# Patient Record
Sex: MALE | Race: WHITE | NOT HISPANIC OR LATINO | Employment: FULL TIME | URBAN - METROPOLITAN AREA
[De-identification: names, ages, dates, MRNs, and addresses within clinical notes are randomized per-mention and may not be internally consistent; named-entity substitution may affect disease eponyms.]

---
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
## 2025-07-12 ENCOUNTER — HOSPITAL ENCOUNTER (EMERGENCY)
Facility: HOSPITAL | Age: 20
Discharge: HOME | End: 2025-07-12
Attending: EMERGENCY MEDICINE
Payer: COMMERCIAL

## 2025-07-12 VITALS
WEIGHT: 158.51 LBS | DIASTOLIC BLOOD PRESSURE: 70 MMHG | TEMPERATURE: 97.4 F | HEIGHT: 69 IN | OXYGEN SATURATION: 99 % | BODY MASS INDEX: 23.48 KG/M2 | RESPIRATION RATE: 16 BRPM | SYSTOLIC BLOOD PRESSURE: 113 MMHG | HEART RATE: 61 BPM
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
## 2025-07-12 DIAGNOSIS — S61.511A LACERATION OF SKIN OF RIGHT WRIST, INITIAL ENCOUNTER: ICD-10-CM

## 2025-07-12 PROCEDURE — 25010000002 LIDOCAINE 1% - EPINEPHRINE 1:100000 1 %-1:100000 SOLUTION: Performed by: EMERGENCY MEDICINE

## 2025-07-12 PROCEDURE — 99283 EMERGENCY DEPT VISIT LOW MDM: CPT

## 2025-07-12 RX ORDER — LIDOCAINE HYDROCHLORIDE AND EPINEPHRINE 10; 10 MG/ML; UG/ML
5 INJECTION, SOLUTION INFILTRATION; PERINEURAL ONCE
Status: COMPLETED | OUTPATIENT
Start: 2025-07-12 | End: 2025-07-12

## 2025-07-12 RX ADMIN — AMOXICILLIN AND CLAVULANATE POTASSIUM 1 TABLET: 875; 125 TABLET, COATED ORAL at 14:09

## 2025-07-12 RX ADMIN — BACITRACIN 0.9 G: 500 OINTMENT TOPICAL at 15:02

## 2025-07-12 RX ADMIN — LIDOCAINE HYDROCHLORIDE,EPINEPHRINE BITARTRATE 5 ML: 10; .01 INJECTION, SOLUTION INFILTRATION; PERINEURAL at 14:08

## 2025-07-12 NOTE — ED NOTES
Patient reports reaching to greet his dog today when dog bit his right wrist. Patient reports dog has bit him in the past. Patient reports dog is up to date on his shots, and states he had a tetanus shot 2 years ago. Dressing removed, wound not bleeding at this time.

## 2025-07-12 NOTE — DISCHARGE INSTRUCTIONS
Keep wound clean and dry.  May apply antibiotic ointment to wound twice a day.  Watch for infection.  May use Tylenol or ibuprofen for pain, sutures should removed in 7 to 10 days, return as needed.

## 2025-07-12 NOTE — ED PROVIDER NOTES
Subjective   History of Present Illness  30-year-old male presents with bite to right wrist.  He was bit by his own dog.  He has no complaints of numbness or weakness.  He has no complaints of other injury.  Review of Systems    No past medical history on file.  Negative  No Known Allergies    No past surgical history on file.    No family history on file.    Social History     Socioeconomic History    Marital status: Single   Tobacco Use    Smoking status: Never     Passive exposure: Never    Smokeless tobacco: Never   Vaping Use    Vaping status: Never Used   Substance and Sexual Activity    Alcohol use: Never    Drug use: Never    Sexual activity: Defer     No routine medication      Objective   Physical Exam  19-year-old male awake alert.  Examination of right wrist reveals 1 cm transverse laceration radial aspect of distal forearm.  There is some surrounding abrasions noted.  He has intact flexion against resistance without pain.  He is neurovasc intact without deficit.  Laceration Repair    Date/Time: 7/12/2025 2:51 PM    Performed by: Desmond Maldonado MD  Authorized by: Desmond Maldonado MD    Consent:     Consent obtained:  Verbal    Risks discussed:  Infection  Universal protocol:     Patient identity confirmed:  Verbally with patient  Anesthesia:     Anesthesia method:  Local infiltration    Local anesthetic:  Lidocaine 1% WITH epi  Laceration details:     Location:  Shoulder/arm    Shoulder/arm location:  R lower arm    Length (cm):  1.2  Pre-procedure details:     Preparation:  Patient was prepped and draped in usual sterile fashion  Exploration:     Wound exploration: wound explored through full range of motion and entire depth of wound visualized    Treatment:     Area cleansed with:  Chlorhexidine    Amount of cleaning:  Standard    Irrigation solution:  Sterile saline  Skin repair:     Repair method:  Sutures    Suture size:  5-0    Suture material:  Nylon  Approximation:     Approximation:   "Loose  Post-procedure details:     Dressing:  Antibiotic ointment    Procedure completion:  Tolerated well, no immediate complications  Comments:      Patient was cleansed and irrigated with copious saline.  Wound was explored through full range of motion.  No deep injury was noted.  Skin was closed with single interrupted 5-0 Ethilon suture             ED Course        No orders to display     Medications   amoxicillin-clavulanate (AUGMENTIN) 875-125 MG per tablet 1 tablet (1 tablet Oral Given 7/12/25 6859)   lidocaine 1% - EPINEPHrine 1:937763 (XYLOCAINE W/EPI) 1 %-1:793067 injection 5 mL (5 mL Infiltration Given by Other 7/12/25 1408)   bacitracin ointment 0.9 g (0.9 g Topical Given 7/12/25 1502)     /70   Pulse 61   Temp 97.4 °F (36.3 °C)   Resp 16   Ht 175.3 cm (69.02\")   Wt 71.9 kg (158 lb 8.2 oz)   SpO2 99%   BMI 23.40 kg/m²                                                    Medical Decision Making  Problems Addressed:  Dog bite, initial encounter: complicated acute illness or injury  Laceration of skin of right wrist, initial encounter: complicated acute illness or injury    Risk  OTC drugs.  Prescription drug management.      Patient's findings were discussed with him and mother.  Infection risk was discussed.  He was given dose of Augmentin and will be sent home with prescription for additional treatment.  He should be current on his tetanus.  They will confirm with his primary provider, return new or worsening symptoms  Final diagnoses:   Dog bite, initial encounter   Laceration of skin of right wrist, initial encounter       ED Disposition  ED Disposition       ED Disposition   Discharge    Condition   Stable    Comment   --               Donny Garcia MD  [address] [facility] [place] [address] [phone]               Medication List        New Prescriptions      amoxicillin-clavulanate 875-125 MG per tablet  Commonly known as: AUGMENTIN  Take 1 tablet by mouth Every 12 " (Twelve) Hours.               Where to Get Your Medications        These medications were sent to [id number] - [place], [place] - [address] AT [address] OF [place] [address] & [address] at the [facility] [address] - [phone]  - [phone] FX  [address], [place] [address]      Phone: [phone]   amoxicillin-clavulanate 875-125 MG per tablet            Desmond Maldonado MD  07/12/25 6989

## 2025-07-12 NOTE — ED NOTES
Patient's wound cleansed with CHG and irrigated with sterile saline, before being sprayed with antiseptic. After sutures were placed, patient's wound was dressed with bacitracin and nonadherent pad.